# Patient Record
Sex: FEMALE | Race: WHITE | NOT HISPANIC OR LATINO
[De-identification: names, ages, dates, MRNs, and addresses within clinical notes are randomized per-mention and may not be internally consistent; named-entity substitution may affect disease eponyms.]

---

## 2021-11-03 PROBLEM — Z00.00 ENCOUNTER FOR PREVENTIVE HEALTH EXAMINATION: Status: ACTIVE | Noted: 2021-11-03

## 2021-11-09 ENCOUNTER — APPOINTMENT (OUTPATIENT)
Dept: NEUROSURGERY | Facility: CLINIC | Age: 41
End: 2021-11-09
Payer: COMMERCIAL

## 2021-11-09 PROCEDURE — 99205 OFFICE O/P NEW HI 60 MIN: CPT

## 2021-11-09 NOTE — PHYSICAL EXAM
[General Appearance - Alert] : alert [General Appearance - In No Acute Distress] : in no acute distress [General Appearance - Well Nourished] : well nourished [General Appearance - Well Developed] : well developed [Oriented To Time, Place, And Person] : oriented to person, place, and time [Impaired Insight] : insight and judgment were intact [Affect] : the affect was normal [Mood] : the mood was normal [Cranial Nerves Optic (II)] : visual acuity intact bilaterally,  pupils equal round and reactive to light [Cranial Nerves Oculomotor (III)] : extraocular motion intact [Cranial Nerves Trigeminal (V)] : facial sensation intact symmetrically [Cranial Nerves Facial (VII)] : face symmetrical [Cranial Nerves Vestibulocochlear (VIII)] : hearing was intact bilaterally [Cranial Nerves Glossopharyngeal (IX)] : tongue and palate midline [Cranial Nerves Accessory (XI - Cranial And Spinal)] : head turning and shoulder shrug symmetric [Cranial Nerves Hypoglossal (XII)] : there was no tongue deviation with protrusion [Motor Tone] : muscle tone was normal in all four extremities [Motor Strength] : muscle strength was normal in all four extremities [Sensation Tactile Decrease] : light touch was intact [Abnormal Walk] : normal gait [Balance] : balance was intact [FreeTextEntry5] : I

## 2021-11-09 NOTE — DATA REVIEWED
[de-identified] : CT HEAD WITHOUT CONTRAST: 11/2/2021: IMPRESSION - No acute abnormality. No significant change from 10/29/2021 [de-identified] : CTA HEAD/NECK: 11/2/2021: FINDINGS: \par \par ANTERIOR CIRCULATION: \par Right internal carotid artery: Unremarkable. Intracranial segment is patent \par with no significant stenosis. No aneurysm. \par Right middle cerebral artery: Unremarkable. No occlusion or significant \par stenosis. No aneurysm.  \par Right anterior cerebral artery: Unremarkable. No occlusion or significant \par stenosis. No aneurysm.  \par \par Left internal carotid artery: 3 mm aneurysm cavernous left ICA.  No stenosis.\par Left middle cerebral artery: Unremarkable. No occlusion or significant \par stenosis. No aneurysm.  \par Left anterior cerebral artery: Unremarkable. No occlusion or significant \par stenosis. No aneurysm.  \par \par POSTERIOR CIRCULATION: \par Right vertebral artery: Unremarkable. No occlusion or significant stenosis. No\par aneurysm.  \par Left vertebral artery: Unremarkable. No occlusion or significant stenosis. No \par aneurysm.  \par Basilar artery: Unremarkable. No occlusion or significant stenosis. No \par aneurysm. \par Right posterior cerebral artery: Unremarkable. No occlusion or significant \par stenosis. No aneurysm.  \par Left posterior cerebral artery: Unremarkable. No occlusion or significant \par stenosis. No aneurysm.  \par \par Brain: No definite mass, mass effect, or midline shift. \par Cerebral ventricles: No ventriculomegaly. \par Bones/joints: Unremarkable. No acute fracture. \par Soft tissues: Unremarkable. \par \par IMPRESSION: \par \par 3 mm aneurysm cavernous left ICA. \par \par \par =========================\par PROCEDURE INFORMATION: \par Exam: CT Angiography Neck With Contrast \par Exam date and time: 11/2/2021 6:20 PM \par Age: 41 years old \par Clinical indication: Headache \par \par TECHNIQUE: \par Imaging protocol: Computed tomography angiography of the neck with contrast. \par 3D rendering (Not supervised by radiologist): MIP and/or 3D reconstructed \par images were created by the technologist. \par Contrast material: ISOV 370; Contrast volume: 100 ml; Contrast route: \par INTRAVENOUS (IV);  \par \par COMPARISON: \par CT HEAD 11/2/2021 6:15 PM \par \par FINDINGS: \par Right common carotid artery: No stenosis. No dissection or occlusion. \par Right internal carotid artery: No stenosis of the extracranial segment. No \par dissection or occlusion. \par Right external carotid artery: No occlusion or stenosis of the origin.  \par \par Left common carotid artery: No stenosis. No dissection or occlusion. \par Left internal carotid artery: No stenosis of the extracranial segment. No \par dissection or occlusion. \par Left external carotid artery: No occlusion or stenosis of the origin.  \par \par Right vertebral artery: No stenosis. No dissection or occlusion. \par Left vertebral artery: No stenosis. No dissection or occlusion. \par \par Soft tissues: Normal. No significant soft tissue swelling. \par \par Bones/joints: No acute fracture. \par \par IMPRESSION: \par No stenosis or occlusion.

## 2021-11-09 NOTE — ASSESSMENT
[FreeTextEntry1] : Ms. Lim presents with symptoms most consistent with post concussive syndrome, and for which she has neurologic consultation scheduled with Dr. Ignacia Baires. CT angiogram reviewed independently by me demonstrates evidence for an approximately 3mm left internal carotid artery aneurysm that appears to be arising from the distal cavernous segment.\par \par Natural history of both cavernous and subarachnoid aneurysms discussed at length with the patient. Alternative management strategies reviewed. Diagnostic cerebral angiography recommended in order to characterize the location and morphology of the aneurysm in greater detail.  Risks including but not limited to bleeding, infection, vascular injury, life threatening hematoma, limb ischemia, need for surgical repair, renal failure, stroke, coma/death, weakness/paralysis, visual/sensory loss, loss of speech/language/cognitive/memory function, changes in personality/behavior, failure of procedure, need for additional procedures discussed. Ms. Lim understands the risk, benefits, and alternatives and wishes to proceed. We will therefore schedule her for diagnostic cerebral angiogram with conscious sedation in the near future.\par \par Signs and symptoms of subarachnoid hemorrhage were reviewed and the patient was instructed to attend the emergency department in the event these were to occur. I have asked the patient to contact my office for any symptomatic development or progression, or for any additional questions or concerns that arise.\par \par A total of 60 minutes were spent relative to this encounter. \par \par \par \par \par \par \par I have asked the patient to contact me for any symptomatic development or progression in the interim at which time we can obtain expedited follow up imaging. \par \par

## 2021-12-08 ENCOUNTER — RESULT REVIEW (OUTPATIENT)
Age: 41
End: 2021-12-08

## 2021-12-08 ENCOUNTER — APPOINTMENT (OUTPATIENT)
Dept: NEUROSURGERY | Facility: HOSPITAL | Age: 41
End: 2021-12-08
Payer: COMMERCIAL

## 2021-12-08 PROCEDURE — ZZZZZ: CPT

## 2021-12-09 ENCOUNTER — NON-APPOINTMENT (OUTPATIENT)
Age: 41
End: 2021-12-09

## 2021-12-14 ENCOUNTER — APPOINTMENT (OUTPATIENT)
Dept: NEUROSURGERY | Facility: CLINIC | Age: 41
End: 2021-12-14
Payer: COMMERCIAL

## 2021-12-14 PROCEDURE — 99215 OFFICE O/P EST HI 40 MIN: CPT

## 2022-11-30 DIAGNOSIS — I67.1 CEREBRAL ANEURYSM, NONRUPTURED: ICD-10-CM

## 2023-06-19 ENCOUNTER — RESULT REVIEW (OUTPATIENT)
Age: 43
End: 2023-06-19

## 2023-07-07 NOTE — HISTORY OF PRESENT ILLNESS
[FreeTextEntry1] : Ms. Lim presents status post diagnostic cerebral angiogram 12/8/21. Previous notes and interval history reviewed. \par \par Today she endorsed that pressure headaches, light sensitivity, memory loss, word finding difficulties are gradually and significantly improving. She recently was evaluated in the emergency room at [facility] on 12/6 for left lower extremity numbness and tingling with radiation to left upper extremity. She was observed overnight and discharged home to follow up with neurology. She was subsequently evaluated by Dr. Ignacia Baires for headache and left upper and lower extremity sensory symptom management. She has not returned to work and plans to in February 2022.  She denies other neurological symptoms.

## 2023-07-07 NOTE — PHYSICAL EXAM
[General Appearance - Alert] : alert [General Appearance - In No Acute Distress] : in no acute distress [General Appearance - Well Nourished] : well nourished [General Appearance - Well Developed] : well developed [Oriented To Time, Place, And Person] : oriented to person, place, and time [Impaired Insight] : insight and judgment were intact [Affect] : the affect was normal [Cranial Nerves Optic (II)] : visual acuity intact bilaterally,  pupils equal round and reactive to light [Cranial Nerves Oculomotor (III)] : extraocular motion intact [Cranial Nerves Trigeminal (V)] : facial sensation intact symmetrically [Cranial Nerves Facial (VII)] : face symmetrical [Cranial Nerves Vestibulocochlear (VIII)] : hearing was intact bilaterally [Cranial Nerves Glossopharyngeal (IX)] : tongue and palate midline [Cranial Nerves Accessory (XI - Cranial And Spinal)] : head turning and shoulder shrug symmetric [Cranial Nerves Hypoglossal (XII)] : there was no tongue deviation with protrusion [Motor Tone] : muscle tone was normal in all four extremities [Motor Strength] : muscle strength was normal in all four extremities [Sensation Tactile Decrease] : light touch was intact [Abnormal Walk] : normal gait [Balance] : balance was intact [FreeTextEntry6] : L [FreeTextEntry7] : Left anterior thigh/leg/foot paresthesia with improvement in nondermatomal distribution.

## 2023-07-07 NOTE — ASSESSMENT
[FreeTextEntry1] : Ms. presents today status post diagnostic cerebral angiogram. Diagnostic angiogram 12/8/21 reviewed independently by me demonstrates an approximately 3.5x3mm left superior hypophyseal artery, ophthlamic segment, internal carotid artery versus carotid cave versus distal left cavernous segment internal carotid artery aneurysm.\par \par Natural history discussed at length with the patient . Alternative management strategies reviewed. The patient was presented at our Cerebrovascular Conference. Consensus cerebrovascular team recommendation is to proceed with annual surveillance. We did discuss endovascular flow diversion with attendant risks and potential benefits. The patient wishes to pursue the recommended conservative management strategy. I therefore plan to obtain a surveillance MRA brain in one year with an appointment to follow.  Signs and symptoms of subarachnoid hemorrhage were once again reviewed and the patient was instructed to attend the emergency department in the event these were to occur.\par \par I have asked the patient to contact me for any symptomatic development or progression in the interim at which time we can obtain expedited follow up imaging.\par \par A total of 45 minutes were spent relative to this encounter. \par \par

## 2023-07-10 ENCOUNTER — APPOINTMENT (OUTPATIENT)
Dept: NEUROSURGERY | Facility: HOSPITAL | Age: 43
End: 2023-07-10
Payer: COMMERCIAL

## 2023-07-10 PROCEDURE — 99215 OFFICE O/P EST HI 40 MIN: CPT

## 2023-07-10 NOTE — HISTORY OF PRESENT ILLNESS
[FreeTextEntry1] : Ms. Lim presents in neurosurgical follow up. Previous notes and interval history reviewed. Surveillance MRA detailed below. Patient continues to have diplopia with far left lateral gaze. She feels that this has modestly worsened over time.

## 2023-07-10 NOTE — ASSESSMENT
[FreeTextEntry1] : Ms. Lim presents with MRA brain 6/20/23 reviewed independently by me which demonstrates stability of small left superior hypophyseal artery, ophthalmic segment, internal carotid artery versus carotid cave versus distal left cavernous segment internal carotid artery aneurysm. She is asymptomatic. Alternative management strategies once again discussed. Natural history reviewed. I plan to obtain surveillance MRA brain in one year with an appointment to follow. \par \par Signs and symptoms of subarachnoid hemorrhage were once again reviewed and the patient was instructed to attend the emergency department in the event these were to occur. I have asked the patient to contact me for any symptomatic development or progression in the interim at which time we can obtain expedited follow up imaging.\par \par I have suggested that she discuss her persistent diplopia with Dr. Baires to determine if neuro-ophthalmologic consultation may be warranted. I do not think that her partial let VI nerve palsy is secondary to her small aneurysm. \par \par A total of 45 minutes were spent relative to this encounter. \par \par

## 2023-07-10 NOTE — PHYSICAL EXAM
[General Appearance - Alert] : alert [General Appearance - In No Acute Distress] : in no acute distress [General Appearance - Well Nourished] : well nourished [Oriented To Time, Place, And Person] : oriented to person, place, and time [Impaired Insight] : insight and judgment were intact [Affect] : the affect was normal [Cranial Nerves Optic (II)] : visual acuity intact bilaterally,  pupils equal round and reactive to light [Cranial Nerves Trigeminal (V)] : facial sensation intact symmetrically [Cranial Nerves Facial (VII)] : face symmetrical [Cranial Nerves Vestibulocochlear (VIII)] : hearing was intact bilaterally [Cranial Nerves Glossopharyngeal (IX)] : tongue and palate midline [Cranial Nerves Accessory (XI - Cranial And Spinal)] : head turning and shoulder shrug symmetric [Cranial Nerves Hypoglossal (XII)] : there was no tongue deviation with protrusion [Motor Tone] : muscle tone was normal in all four extremities [Motor Strength] : muscle strength was normal in all four extremities [Sensation Tactile Decrease] : light touch was intact [Abnormal Walk] : normal gait [Balance] : balance was intact [FreeTextEntry5] : CN VI palsy noted upon neurological examination which has remained stable

## 2023-07-10 NOTE — DATA REVIEWED
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

## 2024-08-18 ENCOUNTER — RESULT REVIEW (OUTPATIENT)
Age: 44
End: 2024-08-18

## 2024-08-26 NOTE — PHYSICAL EXAM
[General Appearance - Alert] : alert [General Appearance - In No Acute Distress] : in no acute distress [Oriented To Time, Place, And Person] : oriented to person, place, and time [Cranial Nerves Optic (II)] : visual acuity intact bilaterally,  pupils equal round and reactive to light [Cranial Nerves Oculomotor (III)] : extraocular motion intact [Cranial Nerves Trigeminal (V)] : facial sensation intact symmetrically [Cranial Nerves Facial (VII)] : face symmetrical [Cranial Nerves Vestibulocochlear (VIII)] : hearing was intact bilaterally [Cranial Nerves Glossopharyngeal (IX)] : tongue and palate midline [Cranial Nerves Accessory (XI - Cranial And Spinal)] : head turning and shoulder shrug symmetric [Cranial Nerves Hypoglossal (XII)] : there was no tongue deviation with protrusion [Motor Tone] : muscle tone was normal in all four extremities [Motor Strength] : muscle strength was normal in all four extremities [Abnormal Walk] : normal gait [Balance] : balance was intact [PERRL With Normal Accommodation] : pupils were equal in size, round, reactive to light, with normal accommodation [Extraocular Movements] : extraocular movements were intact

## 2024-08-27 ENCOUNTER — APPOINTMENT (OUTPATIENT)
Dept: NEUROSURGERY | Facility: CLINIC | Age: 44
End: 2024-08-27
Payer: COMMERCIAL

## 2024-08-27 PROCEDURE — G2211 COMPLEX E/M VISIT ADD ON: CPT | Mod: NC

## 2024-08-27 PROCEDURE — 99215 OFFICE O/P EST HI 40 MIN: CPT

## 2024-08-27 NOTE — HISTORY OF PRESENT ILLNESS
Problem: CARDIOVASCULAR - ADULT  Goal: Maintains optimal cardiac output and hemodynamic stability  INTERVENTIONS:  - Monitor vital signs, rhythm, and trends  - Monitor for bleeding, hypotension and signs of decreased cardiac output  - Evaluate effectivenes [FreeTextEntry1] : RANGEL MENSAH is a 44 year female who presents in neurosurgery follow up. Previous notes and interval history reviewed. No cranial nerve palsy noted on today's examination. She has seen Dr. 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 She continues to see Dr. Baires in longitudinal neurologic follow up for post concussive syndrome. Surveillance MRA brain detailed below.

## 2024-08-27 NOTE — ASSESSMENT
[FreeTextEntry1] : Ms. Lim presents with MRA brain 8/19/24 reviewed independently by me which demonstrates stability of small left superior hypophyseal artery, ophthalmic segment, internal carotid artery versus carotid cave versus distal left cavernous segment internal carotid artery aneurysm for which annual surveillance has been recommended. She is asymptomatic. Natural history once again reviewed. Alternative management strategies once again discussed including endovascular flow diversion versus continued surveillance. The patient wishes to continue with annual surveillance. I therefore plan to obtain surveillance MRA brain in one year with an appointment to follow.   Signs and symptoms of subarachnoid hemorrhage were once again reviewed and the patient was instructed to attend the emergency department in the event these were to occur. I have asked the patient to contact me for any symptomatic development or progression in the interim at which time we can obtain expedited follow up imaging.  A total of 45 minutes were spent relative to this encounter.

## 2024-08-27 NOTE — DATA REVIEWED
[de-identified] : PROCEDURE: MRA HEAD 8/19/24  ORDER #: [id number] CC: ; ; ; End of cc's  .  CLINICAL INFORMATION: Aneurysm. Follow-up.  TECHNIQUE: MRA images through the [place] [facility] [place] were obtained using a combination of 2-D and 3-D time-of-flight acquisition. The data was then reformatted into a volumetric data set and reviewed as rotational MIP images.  COMPARISON: Prior MR angiography study of the head from 6/20/2023 prior CT angiogram studies of the head and neck from 11/2/2021.  FINDINGS: There is an unchanged 3.5 mm saccular outpouching off the distal left cavernous/clinoid internal carotid artery which projects medially into the sella. This lies proximal to the ophthalmic artery origin.  Otherwise, the bilateral intracranial internal carotid, anterior, and middle cerebral arteries appear unremarkable.  The anterior and bilateral posterior communicating arteries are seen.  The posterior circulation appears intact.  No arteriovenous malformations are visualized.  IMPRESSION: Unchanged 3.5 mm left-sided superior hypophyseal aneurysm when compared to the prior MR angiography study from 6/20/2023.

## 2024-08-27 NOTE — HISTORY OF PRESENT ILLNESS
[FreeTextEntry1] : RANGEL MENSAH is a 44 year female who presents in neurosurgery follow up. Previous notes and interval history reviewed. No cranial nerve palsy noted on today's examination. She has seen Dr. 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 She continues to see Dr. Baires in longitudinal neurologic follow up for post concussive syndrome. Surveillance MRA brain detailed below.

## 2024-08-27 NOTE — DATA REVIEWED
[de-identified] : PROCEDURE: MRA HEAD 8/19/24  ORDER #: [id number] CC: ; ; ; End of cc's  .  CLINICAL INFORMATION: Aneurysm. Follow-up.  TECHNIQUE: MRA images through the [place] [facility] [place] were obtained using a combination of 2-D and 3-D time-of-flight acquisition. The data was then reformatted into a volumetric data set and reviewed as rotational MIP images.  COMPARISON: Prior MR angiography study of the head from 6/20/2023 prior CT angiogram studies of the head and neck from 11/2/2021.  FINDINGS: There is an unchanged 3.5 mm saccular outpouching off the distal left cavernous/clinoid internal carotid artery which projects medially into the sella. This lies proximal to the ophthalmic artery origin.  Otherwise, the bilateral intracranial internal carotid, anterior, and middle cerebral arteries appear unremarkable.  The anterior and bilateral posterior communicating arteries are seen.  The posterior circulation appears intact.  No arteriovenous malformations are visualized.  IMPRESSION: Unchanged 3.5 mm left-sided superior hypophyseal aneurysm when compared to the prior MR angiography study from 6/20/2023.